# Patient Record
Sex: MALE | Race: WHITE | NOT HISPANIC OR LATINO
[De-identification: names, ages, dates, MRNs, and addresses within clinical notes are randomized per-mention and may not be internally consistent; named-entity substitution may affect disease eponyms.]

---

## 2017-01-04 ENCOUNTER — APPOINTMENT (OUTPATIENT)
Dept: ORTHOPEDIC SURGERY | Facility: CLINIC | Age: 52
End: 2017-01-04

## 2017-01-04 ENCOUNTER — APPOINTMENT (OUTPATIENT)
Dept: MRI IMAGING | Facility: CLINIC | Age: 52
End: 2017-01-04

## 2017-01-04 ENCOUNTER — OUTPATIENT (OUTPATIENT)
Dept: OUTPATIENT SERVICES | Facility: HOSPITAL | Age: 52
LOS: 1 days | End: 2017-01-04
Payer: COMMERCIAL

## 2017-01-04 VITALS
SYSTOLIC BLOOD PRESSURE: 95 MMHG | HEART RATE: 80 BPM | BODY MASS INDEX: 25.9 KG/M2 | WEIGHT: 185 LBS | DIASTOLIC BLOOD PRESSURE: 65 MMHG | HEIGHT: 71 IN

## 2017-01-04 DIAGNOSIS — Z78.9 OTHER SPECIFIED HEALTH STATUS: ICD-10-CM

## 2017-01-04 DIAGNOSIS — S49.92XA UNSPECIFIED INJURY OF LEFT SHOULDER AND UPPER ARM, INITIAL ENCOUNTER: ICD-10-CM

## 2017-01-04 PROCEDURE — 73221 MRI JOINT UPR EXTREM W/O DYE: CPT

## 2017-01-04 RX ORDER — ACETAMINOPHEN 500 MG
1000 TABLET ORAL
Refills: 0 | Status: ACTIVE | COMMUNITY

## 2017-01-05 RX ORDER — MELOXICAM 15 MG/1
15 TABLET ORAL
Qty: 60 | Refills: 2 | Status: ACTIVE | COMMUNITY
Start: 2017-01-05 | End: 1900-01-01

## 2017-01-13 ENCOUNTER — APPOINTMENT (OUTPATIENT)
Dept: RADIOLOGY | Facility: CLINIC | Age: 52
End: 2017-01-13

## 2017-01-13 ENCOUNTER — APPOINTMENT (OUTPATIENT)
Dept: ULTRASOUND IMAGING | Facility: CLINIC | Age: 52
End: 2017-01-13

## 2017-01-13 ENCOUNTER — OUTPATIENT (OUTPATIENT)
Dept: OUTPATIENT SERVICES | Facility: HOSPITAL | Age: 52
LOS: 1 days | End: 2017-01-13
Payer: COMMERCIAL

## 2017-01-13 DIAGNOSIS — Z00.8 ENCOUNTER FOR OTHER GENERAL EXAMINATION: ICD-10-CM

## 2017-01-13 PROCEDURE — 73040 CONTRAST X-RAY OF SHOULDER: CPT

## 2017-01-13 PROCEDURE — 23350 INJECTION FOR SHOULDER X-RAY: CPT

## 2019-03-12 ENCOUNTER — TRANSCRIPTION ENCOUNTER (OUTPATIENT)
Age: 54
End: 2019-03-12

## 2019-03-17 ENCOUNTER — TRANSCRIPTION ENCOUNTER (OUTPATIENT)
Age: 54
End: 2019-03-17

## 2019-10-01 ENCOUNTER — APPOINTMENT (OUTPATIENT)
Dept: PULMONOLOGY | Facility: CLINIC | Age: 54
End: 2019-10-01
Payer: COMMERCIAL

## 2019-10-01 VITALS
WEIGHT: 187 LBS | OXYGEN SATURATION: 98 % | DIASTOLIC BLOOD PRESSURE: 80 MMHG | SYSTOLIC BLOOD PRESSURE: 110 MMHG | HEART RATE: 78 BPM | TEMPERATURE: 98 F | RESPIRATION RATE: 16 BRPM | BODY MASS INDEX: 26.08 KG/M2

## 2019-10-01 DIAGNOSIS — J44.9 CHRONIC OBSTRUCTIVE PULMONARY DISEASE, UNSPECIFIED: ICD-10-CM

## 2019-10-01 DIAGNOSIS — R05 COUGH: ICD-10-CM

## 2019-10-01 PROCEDURE — 99203 OFFICE O/P NEW LOW 30 MIN: CPT | Mod: 25

## 2019-10-01 PROCEDURE — 94727 GAS DIL/WSHOT DETER LNG VOL: CPT

## 2019-10-01 PROCEDURE — 94729 DIFFUSING CAPACITY: CPT

## 2019-10-01 PROCEDURE — 94060 EVALUATION OF WHEEZING: CPT

## 2019-10-01 RX ORDER — MONTELUKAST 10 MG/1
10 TABLET, FILM COATED ORAL
Qty: 90 | Refills: 3 | Status: ACTIVE | COMMUNITY
Start: 2019-10-01 | End: 1900-01-01

## 2019-10-01 RX ORDER — ALBUTEROL SULFATE 90 UG/1
108 (90 BASE) AEROSOL, METERED RESPIRATORY (INHALATION)
Refills: 0 | Status: ACTIVE | COMMUNITY
Start: 2019-10-01

## 2019-10-01 RX ORDER — METHYLPREDNISOLONE 4 MG/1
4 TABLET ORAL
Qty: 1 | Refills: 0 | Status: ACTIVE | COMMUNITY
Start: 2019-10-01 | End: 1900-01-01

## 2019-10-06 PROBLEM — R05 CHRONIC COUGHING: Status: ACTIVE | Noted: 2019-10-06

## 2019-10-06 NOTE — REVIEW OF SYSTEMS
[Cough] : cough [Sputum] : not coughing up ~M sputum [Dyspnea] : dyspnea [Chest Tightness] : chest tightness [Wheezing] : wheezing [Negative] : Sleep Disorder

## 2019-10-06 NOTE — DISCUSSION/SUMMARY
[FreeTextEntry1] : 53 yo male with OAD related complaints. Medrol pack was prescribed with daily use of montelukast and PRN albuterol MDI use. ICS/LABA use in the future if symptoms persist. He is to follow up with his PMD as before.